# Patient Record
Sex: MALE | URBAN - METROPOLITAN AREA
[De-identification: names, ages, dates, MRNs, and addresses within clinical notes are randomized per-mention and may not be internally consistent; named-entity substitution may affect disease eponyms.]

---

## 2017-02-17 ENCOUNTER — EMERGENCY (EMERGENCY)
Facility: HOSPITAL | Age: 57
LOS: 1 days | Discharge: HOME | End: 2017-02-17
Attending: EMERGENCY MEDICINE
Payer: COMMERCIAL

## 2017-02-17 VITALS
HEIGHT: 72 IN | RESPIRATION RATE: 18 BRPM | DIASTOLIC BLOOD PRESSURE: 84 MMHG | TEMPERATURE: 97 F | SYSTOLIC BLOOD PRESSURE: 130 MMHG | HEART RATE: 66 BPM | OXYGEN SATURATION: 99 % | WEIGHT: 199.96 LBS

## 2017-02-17 DIAGNOSIS — H00.012 HORDEOLUM EXTERNUM RIGHT LOWER EYELID: ICD-10-CM

## 2017-02-17 DIAGNOSIS — S05.91XA UNSPECIFIED INJURY OF RIGHT EYE AND ORBIT, INITIAL ENCOUNTER: ICD-10-CM

## 2017-02-17 PROCEDURE — 99282 EMERGENCY DEPT VISIT SF MDM: CPT

## 2017-02-17 NOTE — ED PROVIDER NOTE - OBJECTIVE STATEMENT
Pertinent PMH/PSH/FHx/SHx and Review of Systems contained within:  57m no med hx pw right eyelid swelling (not eye as triage complaint says). right lower eyelid x1 day. not associated with trauma or injury. no fever, no vision change or pain with ocular movements.  Fh and Sh not otherwise contributory  ROS otherwise negative

## 2024-11-05 NOTE — ED ADULT TRIAGE NOTE - HEIGHT IN INCHES
[Well Developed] : well developed [Well Nourished] : well nourished 0 [No Acute Distress] : no acute distress [No Carotid Bruit] : no carotid bruit [Normal S1, S2] : normal S1, S2 [No Murmur] : no murmur [No Rub] : no rub [No Gallop] : no gallop [No Edema] : no edema [No Rash] : no rash [Moves all extremities] : moves all extremities [Alert and Oriented] : alert and oriented [Normal memory] : normal memory [de-identified] : Diminished at bases [de-identified] : slow gait [de-identified] : Varicosities noted bilateral lower extremity